# Patient Record
Sex: FEMALE | Race: WHITE | NOT HISPANIC OR LATINO | URBAN - METROPOLITAN AREA
[De-identification: names, ages, dates, MRNs, and addresses within clinical notes are randomized per-mention and may not be internally consistent; named-entity substitution may affect disease eponyms.]

---
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
## 2021-06-16 PROBLEM — Z00.00 ENCOUNTER FOR PREVENTIVE HEALTH EXAMINATION: Status: ACTIVE | Noted: 2021-06-16

## 2022-10-17 ENCOUNTER — OFFICE (OUTPATIENT)
Dept: URBAN - METROPOLITAN AREA CLINIC 92 | Facility: CLINIC | Age: 22
Setting detail: OPHTHALMOLOGY
End: 2022-10-17
Payer: COMMERCIAL

## 2022-10-17 DIAGNOSIS — H35.363: ICD-10-CM

## 2022-10-17 DIAGNOSIS — H52.03: ICD-10-CM

## 2022-10-17 PROBLEM — H52.7 REFRACTIVE ERROR ; BOTH EYES: Status: ACTIVE | Noted: 2022-10-17

## 2022-10-17 PROCEDURE — 92015 DETERMINE REFRACTIVE STATE: CPT | Performed by: OPHTHALMOLOGY

## 2022-10-17 PROCEDURE — 92250 FUNDUS PHOTOGRAPHY W/I&R: CPT | Performed by: OPHTHALMOLOGY

## 2022-10-17 PROCEDURE — 92004 COMPRE OPH EXAM NEW PT 1/>: CPT | Performed by: OPHTHALMOLOGY

## 2022-10-17 ASSESSMENT — REFRACTION_MANIFEST
OD_AXIS: 115
OS_AXIS: 065
OS_SPHERE: PLANO
OS_VA1: 20/20
OS_CYLINDER: -0.50
OD_CYLINDER: -0.50
OD_SPHERE: PLANO
OD_VA1: 20/20

## 2022-10-17 ASSESSMENT — KERATOMETRY
OD_K1POWER_DIOPTERS: 44.25
OD_K2POWER_DIOPTERS: 45.00
OS_K1POWER_DIOPTERS: 44.25
OS_K2POWER_DIOPTERS: 45.00
OS_AXISANGLE_DEGREES: 091
METHOD_AUTO_MANUAL: AUTO
OD_AXISANGLE_DEGREES: 081

## 2022-10-17 ASSESSMENT — TONOMETRY
OS_IOP_MMHG: 18
OD_IOP_MMHG: 18

## 2022-10-17 ASSESSMENT — CONFRONTATIONAL VISUAL FIELD TEST (CVF)
OS_FINDINGS: FULL
OD_FINDINGS: FULL

## 2022-10-17 ASSESSMENT — REFRACTION_AUTOREFRACTION
OD_CYLINDER: +0.50
OD_SPHERE: -0.50
OD_AXIS: 031
OS_CYLINDER: +0.50
OS_AXIS: 144
OS_SPHERE: -0.50

## 2022-10-17 ASSESSMENT — VISUAL ACUITY
OD_BCVA: 20/20
OS_BCVA: 20/25

## 2022-10-17 ASSESSMENT — SPHEQUIV_DERIVED
OD_SPHEQUIV: -0.25
OS_SPHEQUIV: -0.25

## 2022-10-17 ASSESSMENT — AXIALLENGTH_DERIVED
OS_AL: 23.2803
OD_AL: 23.2803

## 2022-11-18 NOTE — ED ADULT NURSE NOTE - OBJECTIVE STATEMENT
Advance Care Planning   Ambulatory ACP Specialist Patient Outreach    Date:  11/18/2022  ACP Specialist:  Tank Desai    Outreach call to patient in follow-up to ACP Specialist referral from: Mana Ivey MD    [x] PCP  [] Provider   [] Ambulatory Care Management [] Other for Reason:    [x] Advance Directive Assistance  [] Code Status Discussion  [] Complete Portable DNR Order  [] Discuss Goals of Care  [] Complete POST/MOST  [] Early ACP Decision-Making  [] Other    Date Referral Received: 10/13/22    Today's Outreach:  [] First   [] Second  [x] Third                               Third outreach made by [x]  phone  [x] email [x]   [employer]     Intervention:  [] Spoke with Patient  [x] Left VM requesting return call      Outcome: Third Outreach. Left detailed VM for Patient to return call. Sent Closure Letter via Email w/ACP Packet included. [employer]. Next Step:   [] ACP scheduled conversation  [] Outreach again in one week               [x] Email / Mail ACP Info Sheets  [x] Email / Mail Advance Directive            [x] Close Referral. Routing closure to referring provider/staff and to ACP Specialist . [x] Closure Letter mailed to Patient with Invitation to Contact ACP Specialist if/when ready.     Thank you for this referral. pt has upper left abd pain  she took motrin with no relief  no n/v or fever

## 2023-10-31 NOTE — ED ADULT NURSE NOTE - NS ED NURSE RECORD ANOTHER HT AND WT
No Verbal/written post procedure instructions were given to patient/caregiver./Instructed patient/caregiver to follow-up with primary care physician./Instructed patient/caregiver regarding signs and symptoms of infection./Keep the cast/splint/dressing clean and dry.

## 2024-04-17 NOTE — ED ADULT NURSE NOTE - GENITOURINARY ASSESSMENT
Patient tolerated dexamethasone 8 mg injection well, instructed to remain in clinic for 15mins.to monitor for allergic reaction. Verbalized understanding.   WDL

## 2025-05-01 NOTE — ED ADULT NURSE NOTE - FALL HARM RISK CONCLUSION
Conjunctivae and eyelids appear normal,  Sclerae : White without injection Universal Safety Interventions